# Patient Record
(demographics unavailable — no encounter records)

---

## 2025-02-10 NOTE — HISTORY OF PRESENT ILLNESS
[FreeTextEntry1] : For comprehensive physical [de-identified] : 46 years old female status post fibroid surgery comes to the office for comprehensive physical

## 2025-02-10 NOTE — ASSESSMENT
[FreeTextEntry1] : Patient will have complete blood work.  Patient gets mammogram by her gynecologist